# Patient Record
Sex: MALE | Race: ASIAN | NOT HISPANIC OR LATINO | Employment: FULL TIME | URBAN - METROPOLITAN AREA
[De-identification: names, ages, dates, MRNs, and addresses within clinical notes are randomized per-mention and may not be internally consistent; named-entity substitution may affect disease eponyms.]

---

## 2021-02-16 ENCOUNTER — APPOINTMENT (OUTPATIENT)
Dept: VACCINE CLINIC | Facility: HOSPITAL | Age: 46
End: 2021-02-16

## 2021-02-27 ENCOUNTER — IMMUNIZATION (OUTPATIENT)
Dept: VACCINE CLINIC | Facility: HOSPITAL | Age: 46
End: 2021-02-27

## 2021-02-27 PROCEDURE — 91300 HC SARSCOV02 VAC 30MCG/0.3ML IM: CPT | Performed by: INTERNAL MEDICINE

## 2021-02-27 PROCEDURE — 0001A: CPT | Performed by: INTERNAL MEDICINE

## 2021-03-20 ENCOUNTER — IMMUNIZATION (OUTPATIENT)
Dept: VACCINE CLINIC | Facility: HOSPITAL | Age: 46
End: 2021-03-20

## 2021-03-20 PROCEDURE — 91300 HC SARSCOV02 VAC 30MCG/0.3ML IM: CPT | Performed by: INTERNAL MEDICINE

## 2021-03-20 PROCEDURE — 0002A: CPT | Performed by: INTERNAL MEDICINE

## 2024-01-08 ENCOUNTER — HOSPITAL ENCOUNTER (EMERGENCY)
Facility: HOSPITAL | Age: 49
Discharge: HOME | End: 2024-01-08
Attending: EMERGENCY MEDICINE | Admitting: EMERGENCY MEDICINE
Payer: COMMERCIAL

## 2024-01-08 VITALS
HEIGHT: 66 IN | SYSTOLIC BLOOD PRESSURE: 130 MMHG | DIASTOLIC BLOOD PRESSURE: 88 MMHG | HEART RATE: 86 BPM | RESPIRATION RATE: 18 BRPM | TEMPERATURE: 97.5 F | WEIGHT: 143 LBS | OXYGEN SATURATION: 98 % | BODY MASS INDEX: 22.98 KG/M2

## 2024-01-08 DIAGNOSIS — K14.6 TONGUE PAIN: Primary | ICD-10-CM

## 2024-01-08 PROCEDURE — 99283 EMERGENCY DEPT VISIT LOW MDM: CPT

## 2024-01-08 RX ORDER — IBUPROFEN 800 MG/1
800 TABLET ORAL ONCE
Status: DISCONTINUED | OUTPATIENT
Start: 2024-01-08 | End: 2024-01-08 | Stop reason: HOSPADM

## 2024-01-08 RX ORDER — LIDOCAINE HYDROCHLORIDE 20 MG/ML
5 SOLUTION OROPHARYNGEAL ONCE
Status: COMPLETED | OUTPATIENT
Start: 2024-01-08 | End: 2024-01-08

## 2024-01-08 RX ADMIN — LIDOCAINE HYDROCHLORIDE 5 ML: 20 SOLUTION ORAL at 20:08

## 2024-01-09 NOTE — ED PROVIDER NOTES
MD ATTESTATION NOTE    SHARED VISIT: This visit was performed by BOTH a physician and an APC. The substantive portion of the medical decision making was performed by this attesting physician who made or approved the management plan and takes responsibility for patient management. All studies documented in the APC note (if performed) were independently interpreted by me.    The DELIA and I have discussed this patient's history, physical exam, MDM, and treatment plan.  I have reviewed the documentation and personally had a face to face interaction with the patient. The attached note describes my personal findings.      Jody Corona is a 49 y.o. male who presents to the ED c/o right-sided tongue pain has been ongoing for 25 days since he bit his tongue while eating an orange.  Since then he has had discomfort.  States that it feels like it is rubbing up against his teeth.  He is concerned that is taking his long for this to heal.  No fever.    On exam:  GENERAL: not distressed  HENT: nares patent, No ulceration.  There is erythema to the right lateral portion of the tongue compared to the contralateral side.  No laceration.  EYES: no scleral icterus  CV: regular rhythm, regular rate  RESPIRATORY: normal effort  MUSCULOSKELETAL: no deformity  NEURO: alert, moves all extremities, follows commands  SKIN: warm, dry    Labs  No results found for this or any previous visit (from the past 24 hour(s)).    Radiology  No Radiology Exams Resulted Within Past 24 Hours    Medications given in the ED:  Medications   Lidocaine Viscous HCl (XYLOCAINE) 2 % solution 5 mL (5 mL Mouth/Throat Given 1/8/24 2008)       Orders placed during this visit:  No orders of the defined types were placed in this encounter.      Medical Decision Making:  ED Course as of 01/09/24 0028   Mon Jan 08, 2024 2036 I discussed the case with Dr. Collado and they agree to evaluate the patient at the bedside.    [CC]   2052 I rechecked the patient.  I discussed  the patient's labs, radiology findings (including all incidental findings), diagnosis, and plan for discharge.  A repeat exam reveals no new worrisome changes from my initial exam findings.  The patient understands that the fact that they are being discharged does not denote that nothing is abnormal, it indicates that no clinical emergency is present and that they must follow-up as directed in order to properly maintain their health.  Follow-up instructions (specifically listed below) and return to ER precautions were given at this time.  I specifically instructed the patient to follow-up with their PCP.  The patient understands and agrees with the plan, and is ready for discharge.  All questions answered.   [CC]      ED Course User Index  [CC] Divya Brooks, LUCA       Differential diagnosis:  Tongue laceration/bite, squamous cell carcinoma or other indolent process, aphthous ulcer    Diagnosis  Final diagnoses:   Tongue pain          Davion Collado II, MD  01/09/24 0030

## 2024-01-09 NOTE — ED NOTES
"Pt arrived via pv from home due to \"biting his tongue & sensation is not improving. Pt seen previously on 12/31 @  & given Dakins Solution w/o improvement.   "

## 2024-01-09 NOTE — ED PROVIDER NOTES
EMERGENCY DEPARTMENT ENCOUNTER    Room Number:  S01/01  Date of encounter:  1/8/2024  PCP: Provider, No Known  Historian: Patient and daughter who assists with translation      HPI:  Chief Complaint: tongue pain     Context: Jody Corona is a 49 y.o. male with no known  past medical history presents emergency department today complaining of discomfort to the right side of his tongue which been ongoing since an injury 25 days ago.  Patient says he bit his tongue while eating an orange and has had some discomfort since then.  He describes it as a burning and says it is more uncomfortable when his teeth rub against the back of his tongue.  He denies palliative factors.  Patient was seen at urgent care and was given Dakin solution with no relief of symptoms.  He has not followed up with a dentist.  He denies swelling, redness, drainage, or significant pain to the tongue.  He denies fever or chills.  He denies numbness or tingling.      MEDICAL RECORD REVIEW:  Patient was seen in urgent care on 12/31/2023 for pain to his tongue.  He was prescribed Dakin's solution.  They noted an ulcer-like wound to the right posterior tongue on their physical exam.      PAST MEDICAL HISTORY    Active Ambulatory Problems     Diagnosis Date Noted    No Active Ambulatory Problems     Resolved Ambulatory Problems     Diagnosis Date Noted    No Resolved Ambulatory Problems     No Additional Past Medical History         PAST SURGICAL HISTORY  No past surgical history on file.      FAMILY HISTORY  No family history on file.      SOCIAL HISTORY  Social History     Socioeconomic History    Marital status:          ALLERGIES  Patient has no known allergies.        REVIEW OF SYSTEMS  All systems reviewed and marked as negative except as listed in HPI         PHYSICAL EXAM  I have reviewed the triage vital signs and nursing notes.    ED Triage Vitals   Temp Heart Rate Resp BP SpO2   01/08/24 1930 01/08/24 1930 01/08/24 1930 01/08/24 1932  01/08/24 1930   97.5 °F (36.4 °C) 86 18 130/88 98 %      Temp src Heart Rate Source Patient Position BP Location FiO2 (%)   01/08/24 1930 01/08/24 1930 01/08/24 1932 01/08/24 1932 --   Tympanic Monitor Sitting Left arm        Physical Exam  Vitals and nursing note reviewed.   Constitutional:       General: He is not in acute distress.     Appearance: Normal appearance.   HENT:      Head: Normocephalic and atraumatic.      Mouth/Throat:      Mouth: Mucous membranes are moist.      Comments: There is some may be mild erythema to the posterior lateral aspect of the tongue without open wound, ulceration, or lesions.  There is no swelling, or signs of infection.  No sublingual or swelling.  No trismus.  Uvula midline.  Handling oral secretions well.  Eyes:      Extraocular Movements: Extraocular movements intact.      Pupils: Pupils are equal, round, and reactive to light.   Cardiovascular:      Rate and Rhythm: Normal rate and regular rhythm.   Pulmonary:      Effort: Pulmonary effort is normal. No respiratory distress.      Breath sounds: Normal breath sounds.   Abdominal:      General: Abdomen is flat.      Palpations: Abdomen is soft.      Tenderness: There is no abdominal tenderness.   Musculoskeletal:         General: Normal range of motion.      Cervical back: Normal range of motion and neck supple.   Skin:     General: Skin is warm and dry.      Capillary Refill: Capillary refill takes less than 2 seconds.   Neurological:      General: No focal deficit present.      Mental Status: He is alert and oriented to person, place, and time.   Psychiatric:         Mood and Affect: Mood normal.         Behavior: Behavior normal.         Vital signs and nursing notes reviewed.            MEDICATIONS GIVEN IN ER  Medications   ibuprofen (ADVIL,MOTRIN) tablet 800 mg (800 mg Oral Not Given 1/8/24 2011)   Lidocaine Viscous HCl (XYLOCAINE) 2 % solution 5 mL (5 mL Mouth/Throat Given 1/8/24 2008)         ED Course:  ED Course as  of 01/08/24 2057 Mon Jan 08, 2024 2036 I discussed the case with Dr. Collado and they agree to evaluate the patient at the bedside.    [CC]   2052 I rechecked the patient.  I discussed the patient's labs, radiology findings (including all incidental findings), diagnosis, and plan for discharge.  A repeat exam reveals no new worrisome changes from my initial exam findings.  The patient understands that the fact that they are being discharged does not denote that nothing is abnormal, it indicates that no clinical emergency is present and that they must follow-up as directed in order to properly maintain their health.  Follow-up instructions (specifically listed below) and return to ER precautions were given at this time.  I specifically instructed the patient to follow-up with their PCP.  The patient understands and agrees with the plan, and is ready for discharge.  All questions answered.   [CC]      ED Course User Index  [CC] Divya Brooks, LUCA           DDX include but not limited to:  Posttraumatic pain, nerve injury, abscess, infection, malignancy    MDM:  AS OF 20:57 EST VITALS:    BP - 130/88  HR - 86  TEMP - 97.5 °F (36.4 °C) (Tympanic)  02 SATS - 98%    Patient is a healthy well-appearing 49-year-old male presents emergency department today with persistent tongue pain following an injury 25 days ago.  On arrival, vitals are reassuring, he is afebrile.  On my exam the patient has some very faint erythema to the posterior lateral aspect of the tongue without obvious swelling or signs of infection.  There are no obvious lesions or signs of masses or malignancy.  Patient has normal range of motion of the tongue without sublingual or swelling, trismus, or drooling.   I suspect the patient's pain is just posttraumatic in nature but  it is not unreasonable for patient to be evaluated by ENT specialist or dentist on an outpatient basis given the longevity of his symptoms.  It is possible that the biting of the  tongue is a red herring and there is an underlying issue to the tongue that would be better evaluated with tissue biopsy.  There are no signs of infection or indications for antibiotics today.  Will refer patient to ENT and treat with topical  anesthetics and dental wax.  Overall patient is appropriate for discharge with outpatient follow-up.        DIAGNOSIS  Final diagnoses:   Tongue pain       Rosalva Garcia, APRN  [address] [facility] [id number] [phone]    Call in 2 days  for follow up and further evaluation    Medardo Amaya III, MD  [address] [id number] [phone]    Call in 2 days  for follow up and further evaluation         Medication List        New Prescriptions      benzocaine 10 % mucosal gel  Commonly known as: ORAJEL  Apply  to the mouth or throat As Needed for Mucositis.     Dental Wax wax  Use 1 each As Needed (for irritation to the tongue).               Where to Get Your Medications        You can get these medications from any pharmacy    Bring a paper prescription for each of these medications  benzocaine 10 % mucosal gel  Dental Wax wax             Dictated utilizing Dragon dictation     Note Disclaimer: At [facility], we believe that sharing information builds trust and better relationships. You are receiving this note because you recently visited [facility]. It is possible you will see health information before a provider has talked with you about it. This kind of information can be easy to misunderstand. To help you fully understand what it means for your health, we urge you to discuss this note with your provider.      Divya Brooks PA-C  01/08/24 5720